# Patient Record
Sex: MALE | Race: WHITE | URBAN - METROPOLITAN AREA
[De-identification: names, ages, dates, MRNs, and addresses within clinical notes are randomized per-mention and may not be internally consistent; named-entity substitution may affect disease eponyms.]

---

## 2018-10-06 ENCOUNTER — OFFICE VISIT (OUTPATIENT)
Dept: URGENT CARE | Age: 38
End: 2018-10-06

## 2018-10-06 VITALS
HEIGHT: 68 IN | HEART RATE: 74 BPM | SYSTOLIC BLOOD PRESSURE: 131 MMHG | BODY MASS INDEX: 30.74 KG/M2 | TEMPERATURE: 98.8 F | OXYGEN SATURATION: 96 % | RESPIRATION RATE: 18 BRPM | DIASTOLIC BLOOD PRESSURE: 74 MMHG | WEIGHT: 202.8 LBS

## 2018-10-06 DIAGNOSIS — K08.89 PAIN, DENTAL: Primary | ICD-10-CM

## 2018-10-06 RX ORDER — AMOXICILLIN AND CLAVULANATE POTASSIUM 875; 125 MG/1; MG/1
1 TABLET, FILM COATED ORAL EVERY 12 HOURS
Qty: 20 TAB | Refills: 0 | Status: SHIPPED | OUTPATIENT
Start: 2018-10-06 | End: 2018-10-16

## 2018-10-06 RX ORDER — IBUPROFEN 800 MG/1
800 TABLET ORAL
Qty: 30 TAB | Refills: 0 | Status: SHIPPED | OUTPATIENT
Start: 2018-10-06

## 2018-10-06 NOTE — PROGRESS NOTES
HPI Comments:   New patient here for upper right molar pain. Has been told he needs a root canal or extraction by dentist  He has not gotten this yet but plans on it. Pain onset today and is specifically requesting an antibiotic and ibuprofen. Pain is 7/10. Intermittent. No fever, chills, pus or discharge or trouble swallowing.  hasnt tried any meds. Overall not improving. Denies significant PMH. No diabetes or immune compromising conditions. Patient is a 41 y.o. male presenting with dental problem. Dental Pain             History reviewed. No pertinent past medical history. History reviewed. No pertinent surgical history. History reviewed. No pertinent family history. Social History     Social History    Marital status:      Spouse name: N/A    Number of children: N/A    Years of education: N/A     Occupational History    Not on file. Social History Main Topics    Smoking status: Current Every Day Smoker    Smokeless tobacco: Never Used    Alcohol use Not on file    Drug use: Not on file    Sexual activity: Not on file     Other Topics Concern    Not on file     Social History Narrative    No narrative on file                ALLERGIES: Review of patient's allergies indicates no known allergies. Review of Systems   Constitutional: Negative for chills and fever. HENT: Positive for dental problem. Neurological: Negative for dizziness and weakness. All other systems reviewed and are negative. Vitals:    10/06/18 1231   BP: 131/74   Pulse: 74   Resp: 18   Temp: 98.8 °F (37.1 °C)   SpO2: 96%   Weight: 202 lb 12.8 oz (92 kg)   Height: 5' 8\" (1.727 m)       Physical Exam   Constitutional: He is oriented to person, place, and time. No distress. HENT:   Head: Normocephalic and atraumatic. Nose: Nose normal.   Mouth/Throat: No oropharyngeal exudate. TMs normal. Good tongue protrusion.    Eyes: Conjunctivae and EOM are normal. Pupils are equal, round, and reactive to light. Neck: Normal range of motion. Neck supple. No tracheal deviation present. No thyromegaly present. Cardiovascular: Normal rate and normal heart sounds. Exam reveals no gallop and no friction rub. No murmur heard. Pulmonary/Chest: Effort normal and breath sounds normal. No respiratory distress. He has no wheezes. He has no rales. Lymphadenopathy:     He has no cervical adenopathy. Neurological: He is alert and oriented to person, place, and time. Skin: No rash noted. He is not diaphoretic. No pallor. Psychiatric: He has a normal mood and affect. His behavior is normal. Thought content normal.       MDM     Differential Diagnosis; Clinical Impression; Plan:       CLINICAL IMPRESSION:  (K08.89) Pain, dental  (primary encounter diagnosis)    Orders Placed This Encounter      ibuprofen (MOTRIN) 800 mg tablet      amoxicillin-clavulanate (AUGMENTIN) 875-125 mg per tablet      Plan:    Follow up with dentist ASAP within 1 week; go to Emergency Department immediately for any new, worsening or changes  Start antibiotic; no large abscess appreciated but some gumline tenderness  Ibuprofen for pain. ED immediately for any new, worsening or changes    We have reviewed concerning signs/symptoms, normal vs abnormal progression of medical condition and when to seek immediate medical attention.           Procedures

## 2018-10-06 NOTE — PATIENT INSTRUCTIONS
Follow up with dentist ASAP within 1 week; go to Emergency Department immediately for any new, worsening or changes      Dental Pain: After Your Visit  Your Care Instructions  The most common cause of dental pain is tooth decay. It can also be caused by an infection of the tooth (abscess) or gum, a tooth that has not broken all the way through the gum (impacted tooth), or a problem with the nerve-filled center of the tooth. Follow-up care is a key part of your treatment and safety. Be sure to make and go to all appointments, and call your doctor if you are having problems. Its also a good idea to know your test results and keep a list of the medicines you take. How can you care for yourself at home? · Contact a dentist for follow-up care. · Put ice or a cold pack on the outside of your mouth for 10 to 20 minutes at a time to reduce pain and swelling. Put a thin cloth between the ice and your skin. · Take an over-the-counter pain medicine, such as acetaminophen (Tylenol), ibuprofen (Advil, Motrin), or naproxen (Aleve). Read and follow all instructions on the label. · Do not take two or more pain medicines at the same time unless the doctor told you to. Many pain medicines have acetaminophen, which is Tylenol. Too much acetaminophen (Tylenol) can be harmful. · Rinse your mouth with warm salt water every 2 hours to help relieve pain and swelling from an infected tooth. Mix 1 teaspoon of salt in 8 ounces of water. · If your doctor prescribed antibiotics, take them as directed. Do not stop taking them just because you feel better. You need to take the full course of antibiotics. When should you call for help? Call your doctor now or seek immediate medical care if:  · You have signs of infection, such as:  ¨ Increased pain, swelling, warmth, or redness. ¨ Pus draining from the gum, tooth, or face. ¨ A fever.   Watch closely for changes in your health, and be sure to contact your doctor if:  · You do not get better as expected. Where can you learn more? Go to [employer]be  Enter V264 in the search box to learn more about \"Dental Pain: After Your Visit. \"   © 0005-3539 Healthwise, Incorporated. Care instructions adapted under license by [facility] (which disclaims liability or warranty for this information). This care instruction is for use with your licensed healthcare professional. If you have questions about a medical condition or this instruction, always ask your healthcare professional. [place] [address] any warranty or liability for your use of this information. Content Version: 43.0.579185;  Last Revised: May 17, 2013

## 2018-10-06 NOTE — MR AVS SNAPSHOT
[address] [facility] [place] [address] 
[phone] Patient: Daniel Wilson MRN: [id number] PASTORA:[birth year] Visit Information Date & Time Provider Department Dept. Phone Encounter #  
 10/6/2018 12:00 PM [address] [facility] [phone] [id number] Upcoming Health Maintenance Date Due DTaP/Tdap/Td series (1 - Tdap) 3/20/2002 Influenza Age 5 to Adult 8/1/2018 Allergies as of 10/6/2018  Review Complete On: 10/6/2018 By: Nils Davies RN No Known Allergies Current Immunizations  Never Reviewed No immunizations on file. Not reviewed this visit You Were Diagnosed With   
  
 Codes Comments Pain, dental    -  Primary ICD-10-CM: I19.06 ICD-9-CM: 525.9 Vitals BP Pulse Temp Resp Height(growth percentile) Weight(growth percentile) 131/74 74 98.8 °F (37.1 °C) 18 5' 8\" (1.727 m) 202 lb 12.8 oz (92 kg) SpO2 BMI Smoking Status 96% 30.84 kg/m2 Current Every Day Smoker BMI and BSA Data Body Mass Index Body Surface Area  
 30.84 kg/m 2 2.1 m 2 Preferred Pharmacy Pharmacy Name Phone NO PHARMACY PREFERENCE Your Updated Medication List  
  
   
This list is accurate as of 10/6/18 12:55 PM.  Always use your most recent med list.  
  
  
  
  
 amoxicillin-clavulanate 875-125 mg per tablet Commonly known as:  AUGMENTIN Take 1 Tab by mouth every twelve (12) hours for 10 days. ibuprofen 800 mg tablet Commonly known as:  MOTRIN Take 1 Tab by mouth every eight (8) hours as needed for Pain. Prescriptions Printed Refills  
 ibuprofen (MOTRIN) 800 mg tablet 0 Sig: Take 1 Tab by mouth every eight (8) hours as needed for Pain. Class: Print Route: Oral  
 amoxicillin-clavulanate (AUGMENTIN) 875-125 mg per tablet 0 Sig: Take 1 Tab by mouth every twelve (12) hours for 10 days. Class: Print  Route: Oral  
 Patient Instructions Follow up with dentist ASAP within 1 week; go to Emergency Department immediately for any new, worsening or changes Dental Pain: After Your Visit Your Care Instructions The most common cause of dental pain is tooth decay. It can also be caused by an infection of the tooth (abscess) or gum, a tooth that has not broken all the way through the gum (impacted tooth), or a problem with the nerve-filled center of the tooth. Follow-up care is a key part of your treatment and safety. Be sure to make and go to all appointments, and call your doctor if you are having problems. Its also a good idea to know your test results and keep a list of the medicines you take. How can you care for yourself at home? · Contact a dentist for follow-up care. · Put ice or a cold pack on the outside of your mouth for 10 to 20 minutes at a time to reduce pain and swelling. Put a thin cloth between the ice and your skin. · Take an over-the-counter pain medicine, such as acetaminophen (Tylenol), ibuprofen (Advil, Motrin), or naproxen (Aleve). Read and follow all instructions on the label. · Do not take two or more pain medicines at the same time unless the doctor told you to. Many pain medicines have acetaminophen, which is Tylenol. Too much acetaminophen (Tylenol) can be harmful. · Rinse your mouth with warm salt water every 2 hours to help relieve pain and swelling from an infected tooth. Mix 1 teaspoon of salt in 8 ounces of water. · If your doctor prescribed antibiotics, take them as directed. Do not stop taking them just because you feel better. You need to take the full course of antibiotics. When should you call for help? Call your doctor now or seek immediate medical care if: 
· You have signs of infection, such as: 
¨ Increased pain, swelling, warmth, or redness. ¨ Pus draining from the gum, tooth, or face. ¨ A fever.  
Watch closely for changes in your health, and be sure to contact your doctor if: 
· You do not get better as expected. Where can you learn more? Go to [employer]be Enter V264 in the search box to learn more about \"Dental Pain: After Your Visit. \"  
© 8389-6987 Healthwise, Incorporated. Care instructions adapted under license by Mehreen Mclean (which disclaims liability or warranty for this information). This care instruction is for use with your licensed healthcare professional. If you have questions about a medical condition or this instruction, always ask your healthcare professional. [address] any warranty or liability for your use of this information. Content Version: 40.3.437423; Last Revised: May 17, 2013 Introducing \[facility] [place] [facility]\"" & HEALTH SERVICES! Mehreen Mclean introduces [employer] patient portal. Now you can access parts of your medical record, email your doctor's office, and request medication refills online. 1. In your internet browser, go to [email or web address] 2. Click on the First Time User? Click Here link in the Sign In box. You will see the New Member Sign Up page. 3. Enter your [employer] Access Code exactly as it appears below. You will not need to use this code after youve completed the sign-up process. If you do not sign up before the expiration date, you must request a new code. · [employer] Access Code: [email or web address] Expires: 1/4/2019 12:55 PM 
 
4. Enter the last four digits of your Social Security Number (xxxx) and Date of Birth (mm/dd/yyyy) as indicated and click Submit. You will be taken to the next sign-up page. 5. Create a Noomt ID. This will be your [employer] login ID and cannot be changed, so think of one that is secure and easy to remember. 6. Create a [employer] password. You can change your password at any time. 7. Enter your Password Reset Question and Answer. This can be used at a later time if you forget your password. 8. Enter your e-mail address. You will receive e-mail notification when new information is available in [address] 9. Click Sign Up. You can now view and download portions of your medical record. 10. Click the Download Summary menu link to download a portable copy of your medical information. If you have questions, please visit the Frequently Asked Questions section of the [employer] website. Remember, [employer] is NOT to be used for urgent needs. For medical emergencies, dial 911. Now available from your iPhone and Android! Please provide this summary of care documentation to your next provider. If you have any questions after today's visit, please call [phone].